# Patient Record
(demographics unavailable — no encounter records)

---

## 2025-02-24 NOTE — ASSESSMENT
[FreeTextEntry1] : Blood work requisition provided. Will follow up with results. RTO 2 weeks for blood pressure review

## 2025-02-24 NOTE — HEALTH RISK ASSESSMENT
[Monthly or less (1 pt)] : Monthly or less (1 point) [1 or 2 (0 pts)] : 1 or 2 (0 points) [Never (0 pts)] : Never (0 points) [No] : In the past 12 months have you used drugs other than those required for medical reasons? No [No falls in past year] : Patient reported no falls in the past year [0] : 2) Feeling down, depressed, or hopeless: Not at all (0) [PHQ-2 Negative - No further assessment needed] : PHQ-2 Negative - No further assessment needed [Never] : Never [Patient reported colonoscopy was normal] : Patient reported colonoscopy was normal [With Family] : lives with family [Reports changes in hearing] : Reports changes in hearing [With Patient/Caregiver] : , with patient/caregiver [Designated Healthcare Proxy] : Designated healthcare proxy [Name: ___] : Health Care Proxy's Name: [unfilled]  [Relationship: ___] : Relationship: [unfilled] [] :  [Fully functional (bathing, dressing, toileting, transferring, walking, feeding)] : Fully functional (bathing, dressing, toileting, transferring, walking, feeding) [Fully functional (using the telephone, shopping, preparing meals, housekeeping, doing laundry, using] : Fully functional and needs no help or supervision to perform IADLs (using the telephone, shopping, preparing meals, housekeeping, doing laundry, using transportation, managing medications and managing finances) [Yes] : Reviewed medication list for presence of high-risk medications. [[id number]] : 0 [Reports changes in vision] : Reports no changes in vision [Reports changes in dental health] : Reports no changes in dental health [ColonoscopyDate] : 04/2024 [de-identified] : decreased hearing  [AdvancecareDate] : 02/24/2025

## 2025-02-24 NOTE — PHYSICAL EXAM
[Normal Bowel Sounds] : normal bowel sounds [Coordination Grossly Intact] : coordination grossly intact [No Focal Deficits] : no focal deficits [Normal] : affect was normal and insight and judgment were intact [de-identified] : morbidly obese male  [de-identified] : +2 pitting edema (RLE), +1 (LLE)

## 2025-02-24 NOTE — HISTORY OF PRESENT ILLNESS
[FreeTextEntry1] : YAMILET [de-identified] : 66yo male with history of morbid obesity, hyperlipidemia, hypertension, controlled diabetes who presents for AWV  Past Medical History: The patient has a past medical history of pre-diabetes for which he is presently medicated with metformin. He reports a history of white coat hypertension. He had taken BP readings after our last visit but did not return for further discussion. He once worked in loud environments which might have led to his present hearing loss. He suffered from COVID-19 infection but has since recovered. Colon cancer screenin2024  Work for his wife in [employer], has three kids  Neurology for neuropathy- Dr. Donald Rush MD

## 2025-02-24 NOTE — PHYSICAL EXAM
[Normal Bowel Sounds] : normal bowel sounds [Coordination Grossly Intact] : coordination grossly intact [No Focal Deficits] : no focal deficits [Normal] : affect was normal and insight and judgment were intact [de-identified] : morbidly obese male  [de-identified] : +2 pitting edema (RLE), +1 (LLE)

## 2025-02-24 NOTE — REVIEW OF SYSTEMS
[Joint Pain] : joint pain [Negative] : Psychiatric [FreeTextEntry9] : knee pain [de-identified] : dry hands [de-identified] : neuropathy

## 2025-02-24 NOTE — HEALTH RISK ASSESSMENT
[Monthly or less (1 pt)] : Monthly or less (1 point) [1 or 2 (0 pts)] : 1 or 2 (0 points) [Never (0 pts)] : Never (0 points) [No] : In the past 12 months have you used drugs other than those required for medical reasons? No [No falls in past year] : Patient reported no falls in the past year [0] : 2) Feeling down, depressed, or hopeless: Not at all (0) [PHQ-2 Negative - No further assessment needed] : PHQ-2 Negative - No further assessment needed [Never] : Never [Patient reported colonoscopy was normal] : Patient reported colonoscopy was normal [With Family] : lives with family [Reports changes in hearing] : Reports changes in hearing [With Patient/Caregiver] : , with patient/caregiver [Designated Healthcare Proxy] : Designated healthcare proxy [Name: ___] : Health Care Proxy's Name: [unfilled]  [Relationship: ___] : Relationship: [unfilled] [] :  [Fully functional (bathing, dressing, toileting, transferring, walking, feeding)] : Fully functional (bathing, dressing, toileting, transferring, walking, feeding) [Fully functional (using the telephone, shopping, preparing meals, housekeeping, doing laundry, using] : Fully functional and needs no help or supervision to perform IADLs (using the telephone, shopping, preparing meals, housekeeping, doing laundry, using transportation, managing medications and managing finances) [Yes] : Reviewed medication list for presence of high-risk medications. [[id number]] : 0 [Reports changes in vision] : Reports no changes in vision [Reports changes in dental health] : Reports no changes in dental health [ColonoscopyDate] : 04/2024 [de-identified] : decreased hearing  [AdvancecareDate] : 02/24/2025

## 2025-02-24 NOTE — HISTORY OF PRESENT ILLNESS
[FreeTextEntry1] : YAMILET [de-identified] : 66yo male with history of morbid obesity, hyperlipidemia, hypertension, controlled diabetes who presents for AWV  Past Medical History: The patient has a past medical history of pre-diabetes for which he is presently medicated with metformin. He reports a history of white coat hypertension. He had taken BP readings after our last visit but did not return for further discussion. He once worked in loud environments which might have led to his present hearing loss. He suffered from COVID-19 infection but has since recovered. Colon cancer screenin2024  Work for his wife in [employer], has three kids  Neurology for neuropathy- Dr. Donald Rush MD

## 2025-05-09 NOTE — DATA REVIEWED
[FreeTextEntry1] : Diagnostic Results: The patient mentions recent blood tests and a fecal occult blood test (FOBT). The FOBT results are reported as positive, with two positive results noted. The patient also mentions a previous colonoscopy, but the date is not specified.

## 2025-05-09 NOTE — HISTORY OF PRESENT ILLNESS
[FreeTextEntry8] : 66yo male with history of morbid obesity, hyperlipidemia, hypertension, controlled diabetes who presents for positive FIT testing and blood pressure management.   He reports having been on blood pressure medication (Fosinopril 40mg) for approximately 10 years. The patient has been monitoring his blood pressure at home, with recent readings averaging 170/100 mmHg, which he acknowledges is high. The patient reports being very active and constantly working, which he believes may contribute to his elevated blood pressure. He has recently traveled to [place], [place], and [place].   Past Medical History:  1. Hypertension (for at least 10 years) 2. Hyperlipidemia (on Atorvastatin) 3. Neuropathy (on Gabapentin) 4. Pre-diabetes (on Metformin)  Review of Systems : - General : No significant weight changes or fatigue reported. - Neurological : History of neuropathy, currently managed with Gabapentin. - Musculoskeletal : No specific complaints mentioned. - Cardiovascular : Reports consistently elevated blood pressure. - Respiratory : No respiratory complaints mentioned. - Gastrointestinal : No gastrointestinal issues reported. - Genitourinary : No genitourinary complaints mentioned. - Integumentary : No skin issues reported. - Psychiatric : No psychiatric concerns mentioned

## 2025-05-09 NOTE — ASSESSMENT
[FreeTextEntry1] : RTO 4 weeks or sooner prn   I am providing continuous care for this patient that includes testing, discussion of options for treatment, and shared decision making with regard to the chosen treatment.

## 2025-05-09 NOTE — PHYSICAL EXAM
[No Respiratory Distress] : no respiratory distress  [Coordination Grossly Intact] : coordination grossly intact [No Focal Deficits] : no focal deficits [Normal] : affect was normal and insight and judgment were intact [de-identified] : morbidly obese male

## 2025-06-18 NOTE — ADDENDUM
[FreeTextEntry1] : Patient seen with NP Alisha Amador. I agree with the assessment and plan of care.

## 2025-06-18 NOTE — HISTORY OF PRESENT ILLNESS
[FreeTextEntry1] : Patient referred for +FIT testing. Denies previous colonoscopy. Denies abdominal pain, n/v, weight loss, loss or appetite, melena, bright red blood per rectum. Denies family history of CRC. BMI>50. Denies hx ESTUARDO.

## 2025-06-18 NOTE — ASSESSMENT
[FreeTextEntry1] : Discussed the positive FIT test with patient. Advised colonoscopy ASAP. Patient verbalized understanding but declined to schedule the procedure due to work. I advised him clearly that the positive FIT test could indicate colon cancer and that it is important to perform the colonoscopy soon to minimize the risk of death or disability from colon cancer. He verbalized understanding and states that he will check his schedule prior to booking a procedure date. Prep prescribed (Split-dose Gavilyte-N) Also advised pre-procedure airway evaluation (BMI >45).  Explained the risks, benefits, indications, alternatives. The risks include but are not limited to bleeding, infection, perforation, reaction to anesthesia, or missed lesions.

## 2025-06-18 NOTE — PHYSICAL EXAM
[Alert] : alert [Normal Voice/Communication] : normal voice/communication [No Acute Distress] : no acute distress [Obese (BMI >= 30)] : obese (BMI >= 30) [Sclera] : the sclera and conjunctiva were normal [Hearing Threshold Finger Rub Not [place]] : hearing was normal [Normal Lips/Gums] : the lips and gums were normal [Oropharynx] : the oropharynx was normal [Normal Appearance] : the appearance of the neck was normal [No Neck Mass] : no neck mass was observed [No Respiratory Distress] : no respiratory distress [No Acc Muscle Use] : no accessory muscle use [Respiration, Rhythm And Depth] : normal respiratory rhythm and effort [Auscultation Breath Sounds / Voice Sounds] : lungs were clear to auscultation bilaterally [Heart Rate And Rhythm] : heart rate was normal and rhythm regular [Normal S1, S2] : normal S1 and S2 [Murmurs] : no murmurs [Bowel Sounds] : normal bowel sounds [Abdomen Tenderness] : non-tender [No Masses] : no abdominal mass palpated [Abdomen Soft] : soft [] : no hepatosplenomegaly [No CVA Tenderness] : no CVA  tenderness [Involuntary Movements] : no involuntary movements were seen [Normal Color / Pigmentation] : normal skin color and pigmentation [No Focal Deficits] : no focal deficits [Oriented To Time, Place, And Person] : oriented to person, place, and time